# Patient Record
Sex: FEMALE | Race: WHITE
[De-identification: names, ages, dates, MRNs, and addresses within clinical notes are randomized per-mention and may not be internally consistent; named-entity substitution may affect disease eponyms.]

---

## 2023-03-20 ENCOUNTER — HOSPITAL ENCOUNTER (OUTPATIENT)
Dept: HOSPITAL 47 - RADUSWWP | Age: 73
Discharge: HOME | End: 2023-03-20
Attending: INTERNAL MEDICINE
Payer: MEDICARE

## 2023-03-20 DIAGNOSIS — R10.30: Primary | ICD-10-CM

## 2023-03-21 NOTE — US
EXAMINATION TYPE: US groin RT, US groin LT

 

DATE OF EXAM: 3/20/2023

 

COMPARISON: NONE

 

CLINICAL HISTORY: R10.30. Swelling cardiac ablation done 6 weeks ago.

 

TECHNIQUE:  Real-time linear array sonography bilateral inguinal regions.

 

FINDINGS:  No abnormalities seen. 

 

There is a small lymph node in left groin measuring 1.2 x 0.7 x 1.1 cm.

 

 

 

IMPRESSION:  

1. No suspicious inguinal abnormalities.

2. Small left-sided lymph node present in the region.

## 2025-05-21 ENCOUNTER — HOSPITAL ENCOUNTER (OUTPATIENT)
Dept: HOSPITAL 47 - LABWHC1 | Age: 75
Discharge: HOME | End: 2025-05-21
Attending: INTERNAL MEDICINE
Payer: MEDICARE

## 2025-05-21 DIAGNOSIS — I10: Primary | ICD-10-CM

## 2025-05-21 LAB
BUN SERPL-SCNC: 14.2 MG/DL (ref 9–27)
BUN/CREAT SERPL: 20.29 RATIO (ref 12–20)
CALCIUM SPEC-MCNC: 9.7 MG/DL (ref 8.7–10.3)
CHLORIDE SERPL-SCNC: 104 MMOL/L (ref 96–109)
CO2 SERPL-SCNC: 27.1 MMOL/L (ref 21.6–31.8)
GLUCOSE SERPL-MCNC: 124 MG/DL (ref 70–110)
POTASSIUM SERPL-SCNC: 4.7 MMOL/L (ref 3.5–5.5)
SODIUM SERPL-SCNC: 143 MMOL/L (ref 135–145)

## 2025-05-21 PROCEDURE — 80048 BASIC METABOLIC PNL TOTAL CA: CPT

## 2025-05-21 PROCEDURE — 36415 COLL VENOUS BLD VENIPUNCTURE: CPT
